# Patient Record
Sex: MALE | Race: OTHER | HISPANIC OR LATINO | URBAN - METROPOLITAN AREA
[De-identification: names, ages, dates, MRNs, and addresses within clinical notes are randomized per-mention and may not be internally consistent; named-entity substitution may affect disease eponyms.]

---

## 2021-05-14 ENCOUNTER — OUTPATIENT (OUTPATIENT)
Dept: OUTPATIENT SERVICES | Facility: HOSPITAL | Age: 46
LOS: 1 days | Discharge: HOME | End: 2021-05-14

## 2021-05-14 DIAGNOSIS — K02.9 DENTAL CARIES, UNSPECIFIED: ICD-10-CM

## 2021-05-31 ENCOUNTER — EMERGENCY (EMERGENCY)
Facility: HOSPITAL | Age: 46
LOS: 0 days | Discharge: HOME | End: 2021-05-31
Attending: EMERGENCY MEDICINE | Admitting: EMERGENCY MEDICINE
Payer: MEDICAID

## 2021-05-31 VITALS
HEART RATE: 67 BPM | TEMPERATURE: 101 F | OXYGEN SATURATION: 98 % | SYSTOLIC BLOOD PRESSURE: 130 MMHG | DIASTOLIC BLOOD PRESSURE: 92 MMHG | WEIGHT: 195.11 LBS | RESPIRATION RATE: 16 BRPM

## 2021-05-31 VITALS
TEMPERATURE: 100 F | DIASTOLIC BLOOD PRESSURE: 72 MMHG | RESPIRATION RATE: 18 BRPM | HEART RATE: 88 BPM | SYSTOLIC BLOOD PRESSURE: 113 MMHG | OXYGEN SATURATION: 99 %

## 2021-05-31 DIAGNOSIS — J02.9 ACUTE PHARYNGITIS, UNSPECIFIED: ICD-10-CM

## 2021-05-31 DIAGNOSIS — R50.9 FEVER, UNSPECIFIED: ICD-10-CM

## 2021-05-31 DIAGNOSIS — R51.9 HEADACHE, UNSPECIFIED: ICD-10-CM

## 2021-05-31 DIAGNOSIS — R09.81 NASAL CONGESTION: ICD-10-CM

## 2021-05-31 DIAGNOSIS — Z20.822 CONTACT WITH AND (SUSPECTED) EXPOSURE TO COVID-19: ICD-10-CM

## 2021-05-31 LAB — SARS-COV-2 RNA SPEC QL NAA+PROBE: SIGNIFICANT CHANGE UP

## 2021-05-31 PROCEDURE — 99284 EMERGENCY DEPT VISIT MOD MDM: CPT

## 2021-05-31 RX ORDER — AZITHROMYCIN 500 MG/1
1 TABLET, FILM COATED ORAL
Qty: 6 | Refills: 0
Start: 2021-05-31 | End: 2021-06-04

## 2021-05-31 RX ORDER — DEXAMETHASONE 0.5 MG/5ML
10 ELIXIR ORAL ONCE
Refills: 0 | Status: COMPLETED | OUTPATIENT
Start: 2021-05-31 | End: 2021-05-31

## 2021-05-31 RX ORDER — ACETAMINOPHEN 500 MG
650 TABLET ORAL ONCE
Refills: 0 | Status: COMPLETED | OUTPATIENT
Start: 2021-05-31 | End: 2021-05-31

## 2021-05-31 RX ADMIN — Medication 10 MILLIGRAM(S): at 22:07

## 2021-05-31 RX ADMIN — Medication 650 MILLIGRAM(S): at 22:06

## 2021-05-31 NOTE — ED PROVIDER NOTE - OBJECTIVE STATEMENT
45 year old male with pmhx noted presents to ed for sore throat. Pt admits to frontal head pressure and nasal congestion x 10 days which is now resolving. pt admits to fever over last few days, t max of 101. pt just finished course of Augmentin. no cough, sob, chest pain, abd pain, or nausea, vomiting, diarrhea. no recent travel or sick contacts.

## 2021-05-31 NOTE — ED PROVIDER NOTE - PATIENT PORTAL LINK FT
You can access the FollowMyHealth Patient Portal offered by [facility] by registering at the following website: [email or web address]. By joining [employer]’s FollowMyHealth portal, you will also be able to view your health information using other applications (apps) compatible with our system.

## 2021-05-31 NOTE — ED PROVIDER NOTE - PHYSICAL EXAMINATION
CONST: Well appearing in NAD  EYES: PERRL, EOMI, Sclera and conjunctiva clear.   ENT: No nasal discharge. TM's clear B/L without drainage. Oropharynx erythematous, small tonsillar exudate. No abscess or swelling. Uvula midline. No temporal artery or mastoid tenderness.  NECK: Non-tender, no meningeal signs. normal ROM. supple   CARD: Normal S1 S2; Normal rate and rhythm  RESP: Equal BS B/L, No wheezes, rhonchi or rales. No distress  GI: Soft, non-tender, non-distended. no cva tenderness. normal BS  MS: Normal ROM in all extremities. No midline spinal tenderness. pulses 2 +.   SKIN: Warm, dry, no acute rashes. Good turgor  NEURO: A&Ox3, No focal deficits.

## 2021-05-31 NOTE — ED ADULT NURSE NOTE - OBJECTIVE STATEMENT
Pt is A and O x3, c/o soar throat , headache, sinus congestions for the past 10 days and fever x4 days, was taken Tylenol. Pt also was taken Amoxicillin for 10 days after tooth extraction and sinus congestion which don`t help at all. No acute distress noted

## 2021-05-31 NOTE — ED PROVIDER NOTE - NS ED ROS FT
Review of Systems:  	•	CONSTITUTIONAL - + fever, no diaphoresis, no chills  	•	SKIN - no rash  	•	HEMATOLOGIC - no bleeding, no bruising  	•	EYES - no eye pain, no blurry vision  	•	ENT - + sore throat  	•	RESPIRATORY - no shortness of breath, no cough  	•	CARDIAC - no chest pain, no palpitations  	•	GI - no abd pain, no nausea, no vomiting, no diarrhea, no constipation  	•	GENITO-URINARY - no discharge, no dysuria; no hematuria, no increased urinary frequency  	•	MUSCULOSKELETAL - no joint paint, no swelling, no redness  	•	NEUROLOGIC - no weakness, no headache, no paresthesias, no LOC  	•	PSYCH - no anxiety, non suicidal, non homicidal, no hallucination, no depression

## 2021-05-31 NOTE — ED PROVIDER NOTE - CLINICAL SUMMARY MEDICAL DECISION MAKING FREE TEXT BOX
45yoM with h/o crack cocaine abuse, states clean x 60 days, presents for ST. Reports having frontal head pressure and congestion 10 days ago, now almost resolved however began having ST and this is his primary concern and area of pain, associated fever for the past few days Tmax 101.8. Treated with full 10-day course Augmentin with clearing of HA but ST appeared. Denies cough, dysuria, rash, abd pain, v/d, and all other symptoms. On exam, febrile, hemodynamically stable, saturating well, NAD, well appearing, sitting comfortably in chair, no WOB or change in phonation, stridor, or salivary pooling, head NCAT, neck supple/full ROM, PERRL, EOMI, anicteric, MMM, large L tonsillar white exudate, no tonsillar swelling, no ant cerv LAD, no JVD, RRR, nml S1/S2, no m/r/g, lungs CTAB, no w/r/r, abd soft, NT, ND, nml BS, no rebound or guarding, AAO, CN's 3-12 grossly intact, HUNT spontaneously, no leg cyanosis or edema, skin warm, well perfused, no rashes or hives. No neck pain or meningeal signs and HA nearly resolved and character low suspicion for meningitis. No e/o cellulitis, UTI, endocarditis, intraabdominal process. No e/o PTA or RPA. Centor 2/4 for Strep and will treat with azithromycin in light of possible failed Augmentin. Patient is very well appearing, NAD, afebrile, hemodynamically stable. No e/o airway compromise. Discharged with azith, instructions in further symptomatic care, return precautions, and need for PMD f/u as well as possible ENT f/u if symptoms persist.

## 2021-07-28 ENCOUNTER — OUTPATIENT (OUTPATIENT)
Dept: OUTPATIENT SERVICES | Facility: HOSPITAL | Age: 46
LOS: 1 days | Discharge: HOME | End: 2021-07-28

## 2021-08-11 ENCOUNTER — OUTPATIENT (OUTPATIENT)
Dept: OUTPATIENT SERVICES | Facility: HOSPITAL | Age: 46
LOS: 1 days | Discharge: HOME | End: 2021-08-11

## 2021-09-29 ENCOUNTER — OUTPATIENT (OUTPATIENT)
Dept: OUTPATIENT SERVICES | Facility: HOSPITAL | Age: 46
LOS: 1 days | Discharge: HOME | End: 2021-09-29

## 2021-10-27 ENCOUNTER — OUTPATIENT (OUTPATIENT)
Dept: OUTPATIENT SERVICES | Facility: HOSPITAL | Age: 46
LOS: 1 days | Discharge: HOME | End: 2021-10-27

## 2021-10-27 DIAGNOSIS — K02.9 DENTAL CARIES, UNSPECIFIED: ICD-10-CM

## 2022-02-18 ENCOUNTER — OUTPATIENT (OUTPATIENT)
Dept: OUTPATIENT SERVICES | Facility: HOSPITAL | Age: 47
LOS: 1 days | Discharge: HOME | End: 2022-02-18

## 2022-03-04 ENCOUNTER — OUTPATIENT (OUTPATIENT)
Dept: OUTPATIENT SERVICES | Facility: HOSPITAL | Age: 47
LOS: 1 days | Discharge: HOME | End: 2022-03-04

## 2022-10-20 PROBLEM — Z00.00 ENCOUNTER FOR PREVENTIVE HEALTH EXAMINATION: Status: ACTIVE | Noted: 2022-10-20

## 2023-01-26 ENCOUNTER — APPOINTMENT (OUTPATIENT)
Dept: NEUROLOGY | Facility: CLINIC | Age: 48
End: 2023-01-26

## 2024-09-25 ENCOUNTER — APPOINTMENT (OUTPATIENT)
Dept: NEUROLOGY | Facility: CLINIC | Age: 49
End: 2024-09-25
Payer: MEDICAID

## 2024-09-25 VITALS
DIASTOLIC BLOOD PRESSURE: 138 MMHG | BODY MASS INDEX: 26.51 KG/M2 | SYSTOLIC BLOOD PRESSURE: 174 MMHG | HEART RATE: 131 BPM | HEIGHT: 73 IN | WEIGHT: 200 LBS

## 2024-09-25 DIAGNOSIS — F17.200 NICOTINE DEPENDENCE, UNSPECIFIED, UNCOMPLICATED: ICD-10-CM

## 2024-09-25 DIAGNOSIS — F95.8 OTHER TIC DISORDERS: ICD-10-CM

## 2024-09-25 DIAGNOSIS — Z86.59 PERSONAL HISTORY OF OTHER MENTAL AND BEHAVIORAL DISORDERS: ICD-10-CM

## 2024-09-25 DIAGNOSIS — Z87.09 PERSONAL HISTORY OF OTHER DISEASES OF THE RESPIRATORY SYSTEM: ICD-10-CM

## 2024-09-25 PROCEDURE — 99203 OFFICE O/P NEW LOW 30 MIN: CPT

## 2024-09-25 RX ORDER — ESCITALOPRAM OXALATE 5 MG/1
TABLET, FILM COATED ORAL
Refills: 0 | Status: ACTIVE | COMMUNITY

## 2024-09-25 RX ORDER — GUANFACINE 1 MG/1
1 TABLET ORAL
Qty: 30 | Refills: 0 | Status: ACTIVE | COMMUNITY
Start: 2024-09-25 | End: 1900-01-01

## 2024-09-25 NOTE — ASSESSMENT
[FreeTextEntry1] : motor tic -MRI of the brain to rule out intracranial pathology -a trial of guanfacine.   -if not better, can refer to movement disorder specialist  or consider botox

## 2024-09-25 NOTE — HISTORY OF PRESENT ILLNESS
[FreeTextEntry1] : Pedrito Lopez presents for a follow-up regarding a voice tic that has been present for approximately 2 years. The patient has not seen a neurologist for this condition but has consulted an ENT who suggested it might be anxiety-related. The tic began after the patient stopped using crack cocaine, which he had been using intermittently. The patient reports that the tic does not occur while speaking or sleeping. He is currently taking Lexapro for anxiety, but it has not been effective in managing the tic.